# Patient Record
Sex: MALE | Race: WHITE | Employment: FULL TIME | URBAN - METROPOLITAN AREA
[De-identification: names, ages, dates, MRNs, and addresses within clinical notes are randomized per-mention and may not be internally consistent; named-entity substitution may affect disease eponyms.]

---

## 2017-10-03 PROBLEM — E66.3 OVERWEIGHT: Status: ACTIVE | Noted: 2017-10-03

## 2017-10-03 PROCEDURE — 36415 COLL VENOUS BLD VENIPUNCTURE: CPT | Performed by: INTERNAL MEDICINE

## 2017-10-03 PROCEDURE — 86803 HEPATITIS C AB TEST: CPT | Performed by: INTERNAL MEDICINE

## 2018-11-05 PROBLEM — E66.3 OVERWEIGHT (BMI 25.0-29.9): Status: ACTIVE | Noted: 2017-10-03

## 2019-12-10 PROBLEM — E04.1 THYROID NODULE: Status: ACTIVE | Noted: 2019-12-10

## 2019-12-10 PROBLEM — Z92.3 H/O HEAD AND NECK RADIATION: Status: ACTIVE | Noted: 2019-12-10

## 2020-02-04 PROBLEM — M19.021 ARTHRITIS OF RIGHT ELBOW: Status: ACTIVE | Noted: 2020-02-04

## 2020-02-04 PROBLEM — M19.021 ARTHRITIS OF RIGHT ELBOW: Chronic | Status: ACTIVE | Noted: 2020-02-04

## 2020-12-09 PROBLEM — Z12.11 SCREENING FOR MALIGNANT NEOPLASM OF COLON: Status: ACTIVE | Noted: 2020-12-09

## 2021-01-11 ENCOUNTER — LAB ENCOUNTER (OUTPATIENT)
Dept: LAB | Age: 76
End: 2021-01-11
Attending: INTERNAL MEDICINE
Payer: MEDICARE

## 2021-01-11 DIAGNOSIS — Z12.11 SCREEN FOR COLON CANCER: ICD-10-CM

## 2021-01-12 ENCOUNTER — ANESTHESIA EVENT (OUTPATIENT)
Dept: ENDOSCOPY | Facility: HOSPITAL | Age: 76
End: 2021-01-12
Payer: MEDICARE

## 2021-01-12 LAB — SARS-COV-2 RNA RESP QL NAA+PROBE: NOT DETECTED

## 2021-01-13 ENCOUNTER — HOSPITAL ENCOUNTER (OUTPATIENT)
Facility: HOSPITAL | Age: 76
Setting detail: HOSPITAL OUTPATIENT SURGERY
Discharge: HOME | End: 2021-01-13
Attending: INTERNAL MEDICINE | Admitting: INTERNAL MEDICINE
Payer: MEDICARE

## 2021-01-13 ENCOUNTER — ANESTHESIA (OUTPATIENT)
Dept: ENDOSCOPY | Facility: HOSPITAL | Age: 76
End: 2021-01-13
Payer: MEDICARE

## 2021-01-13 VITALS
WEIGHT: 195 LBS | BODY MASS INDEX: 27.92 KG/M2 | RESPIRATION RATE: 16 BRPM | HEART RATE: 51 BPM | OXYGEN SATURATION: 100 % | HEIGHT: 70 IN | SYSTOLIC BLOOD PRESSURE: 104 MMHG | TEMPERATURE: 98 F | DIASTOLIC BLOOD PRESSURE: 82 MMHG

## 2021-01-13 DIAGNOSIS — Z12.11 SCREEN FOR COLON CANCER: Primary | ICD-10-CM

## 2021-01-13 PROCEDURE — 0DJD8ZZ INSPECTION OF LOWER INTESTINAL TRACT, VIA NATURAL OR ARTIFICIAL OPENING ENDOSCOPIC: ICD-10-PCS | Performed by: INTERNAL MEDICINE

## 2021-01-13 RX ORDER — LIDOCAINE HYDROCHLORIDE 10 MG/ML
INJECTION, SOLUTION EPIDURAL; INFILTRATION; INTRACAUDAL; PERINEURAL AS NEEDED
Status: DISCONTINUED | OUTPATIENT
Start: 2021-01-13 | End: 2021-01-13 | Stop reason: SURG

## 2021-01-13 RX ORDER — SODIUM CHLORIDE, SODIUM LACTATE, POTASSIUM CHLORIDE, CALCIUM CHLORIDE 600; 310; 30; 20 MG/100ML; MG/100ML; MG/100ML; MG/100ML
INJECTION, SOLUTION INTRAVENOUS CONTINUOUS
Status: DISCONTINUED | OUTPATIENT
Start: 2021-01-13 | End: 2021-01-13

## 2021-01-13 RX ADMIN — LIDOCAINE HYDROCHLORIDE 50 MG: 10 INJECTION, SOLUTION EPIDURAL; INFILTRATION; INTRACAUDAL; PERINEURAL at 13:57:00

## 2021-01-13 RX ADMIN — SODIUM CHLORIDE, SODIUM LACTATE, POTASSIUM CHLORIDE, CALCIUM CHLORIDE: 600; 310; 30; 20 INJECTION, SOLUTION INTRAVENOUS at 13:59:00

## 2021-01-13 NOTE — OPERATIVE REPORT
[id number] [facility] Patient Status:  Hospital Outpatient Surgery    1945 MRN [id number]   [facility] ENDOSCOPY Attending Aurelia Palomino MD   Hosp Day # 0 PCP Presley Day MD         PATIENT NAME: Samy Estelita

## 2021-01-13 NOTE — H&P
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   CRC screening    See note reviewed from 12/18/20    PROCEDURE: Colonoscopy    Allergies: Atorvastatin Calcium  No current outpatie Temp 97.7 °F (36.5 °C) (Temporal)   Resp 20   Ht 5' 10\" (1.778 m)   Wt 195 lb (88.5 kg)   SpO2 100%   BMI 27.98 kg/m²  - Body mass index is 27.98 kg/m²., A+0x3, HEENT: non-icteric, LUNGS: CTA, HEART: RRR, ABDOMEN:+BS, soft, non-tender, no guarding, EXTREM

## 2021-01-13 NOTE — ANESTHESIA PREPROCEDURE EVALUATION
PRE-OP EVALUATION    Patient Name: Dionna Ortega    Pre-op Diagnosis: Screen for colon cancer [Z12.11]    Procedure(s):  COLONOSCOPY    Surgeon(s) and Role:     * Mahesh Santacruz MD - Primary    Pre-op vitals reviewed.   Temp: 97.7 °F (36.5 °C)  Pulse 2021     H/O head and neck radiation     Arthritis of right elbow     Screening for malignant neoplasm of colon          Past Surgical History:   Procedure Laterality Date   • APPENDECTOMY     • CARDIAC PACEMAKER PLACEMENT      Book&Table   • COLONOSCOPY  1 is unlikely although intraop recall, if it occurs, may be a reasonable and comfortable experience with this anesthetic.  Aware that general anesthesia is not intended though deep sedation may include brief moments of general anesthesia.   Questions answere

## 2021-01-13 NOTE — ANESTHESIA POSTPROCEDURE EVALUATION
[id number] [facility] Patient Status:  Hospital Outpatient Surgery   Age/Gender 70year old male MRN [id number]   [facility] ENDOSCOPY Attending Hong Madrid MD   [facility] Day # 0 PCP Mirian Boston MD       Anesthesia Pos

## 2021-03-31 PROBLEM — C61 PROSTATE CANCER (HCC): Status: ACTIVE | Noted: 2021-03-31

## 2021-12-10 PROBLEM — I77.810 THORACIC AORTIC ECTASIA (HCC): Status: ACTIVE | Noted: 2021-12-10

## (undated) DEVICE — Device: Brand: DEFENDO AIR/WATER/SUCTION AND BIOPSY VALVE

## (undated) DEVICE — FILTERLINE NASAL ADULT O2/CO2

## (undated) DEVICE — 1200CC GUARDIAN II: Brand: GUARDIAN

## (undated) DEVICE — ENDOSCOPY PACK - LOWER: Brand: MEDLINE INDUSTRIES, INC.

## (undated) DEVICE — 3M™ RED DOT™ MONITORING ELECTRODE WITH FOAM TAPE AND STICKY GEL, 50/BAG, 20/CASE, 72/PLT 2570: Brand: RED DOT™